# Patient Record
Sex: MALE | Race: WHITE | HISPANIC OR LATINO | Employment: OTHER | URBAN - METROPOLITAN AREA
[De-identification: names, ages, dates, MRNs, and addresses within clinical notes are randomized per-mention and may not be internally consistent; named-entity substitution may affect disease eponyms.]

---

## 2022-04-21 ENCOUNTER — OFFICE VISIT (OUTPATIENT)
Dept: INTERNAL MEDICINE | Facility: CLINIC | Age: 77
End: 2022-04-21
Payer: MEDICARE

## 2022-04-21 ENCOUNTER — LAB VISIT (OUTPATIENT)
Dept: LAB | Facility: HOSPITAL | Age: 77
End: 2022-04-21
Payer: MEDICARE

## 2022-04-21 VITALS
BODY MASS INDEX: 22.47 KG/M2 | HEART RATE: 80 BPM | HEIGHT: 71 IN | RESPIRATION RATE: 16 BRPM | WEIGHT: 160.5 LBS | SYSTOLIC BLOOD PRESSURE: 130 MMHG | DIASTOLIC BLOOD PRESSURE: 60 MMHG

## 2022-04-21 DIAGNOSIS — Z13.6 ENCOUNTER FOR ABDOMINAL AORTIC ANEURYSM (AAA) SCREENING: ICD-10-CM

## 2022-04-21 DIAGNOSIS — Z87.891 HISTORY OF SMOKING 25-50 PACK YEARS: ICD-10-CM

## 2022-04-21 DIAGNOSIS — Z11.3 SCREENING EXAMINATION FOR STD (SEXUALLY TRANSMITTED DISEASE): ICD-10-CM

## 2022-04-21 DIAGNOSIS — E11.9 TYPE 2 DIABETES MELLITUS WITHOUT COMPLICATION, WITHOUT LONG-TERM CURRENT USE OF INSULIN: ICD-10-CM

## 2022-04-21 DIAGNOSIS — M25.50 ARTHRALGIA, UNSPECIFIED JOINT: ICD-10-CM

## 2022-04-21 DIAGNOSIS — Z00.00 ENCOUNTER FOR ANNUAL PHYSICAL EXAM: Primary | ICD-10-CM

## 2022-04-21 DIAGNOSIS — Z00.00 ENCOUNTER FOR ANNUAL PHYSICAL EXAM: ICD-10-CM

## 2022-04-21 LAB
ALBUMIN SERPL BCP-MCNC: 3.9 G/DL (ref 3.5–5.2)
ALP SERPL-CCNC: 89 U/L (ref 55–135)
ALT SERPL W/O P-5'-P-CCNC: 33 U/L (ref 10–44)
ANION GAP SERPL CALC-SCNC: 13 MMOL/L (ref 8–16)
AST SERPL-CCNC: 19 U/L (ref 10–40)
BASOPHILS # BLD AUTO: 0.04 K/UL (ref 0–0.2)
BASOPHILS NFR BLD: 0.6 % (ref 0–1.9)
BILIRUB SERPL-MCNC: 0.3 MG/DL (ref 0.1–1)
BUN SERPL-MCNC: 13 MG/DL (ref 8–23)
CALCIUM SERPL-MCNC: 9.9 MG/DL (ref 8.7–10.5)
CCP AB SER IA-ACNC: <0.5 U/ML
CHLORIDE SERPL-SCNC: 103 MMOL/L (ref 95–110)
CHOLEST SERPL-MCNC: 236 MG/DL (ref 120–199)
CHOLEST/HDLC SERPL: 5.8 {RATIO} (ref 2–5)
CO2 SERPL-SCNC: 23 MMOL/L (ref 23–29)
CREAT SERPL-MCNC: 0.9 MG/DL (ref 0.5–1.4)
CRP SERPL-MCNC: 4.1 MG/L (ref 0–8.2)
DIFFERENTIAL METHOD: ABNORMAL
EOSINOPHIL # BLD AUTO: 0.2 K/UL (ref 0–0.5)
EOSINOPHIL NFR BLD: 3.6 % (ref 0–8)
ERYTHROCYTE [DISTWIDTH] IN BLOOD BY AUTOMATED COUNT: 12.6 % (ref 11.5–14.5)
ERYTHROCYTE [SEDIMENTATION RATE] IN BLOOD BY WESTERGREN METHOD: 13 MM/HR (ref 0–23)
EST. GFR  (AFRICAN AMERICAN): >60 ML/MIN/1.73 M^2
EST. GFR  (NON AFRICAN AMERICAN): >60 ML/MIN/1.73 M^2
GLUCOSE SERPL-MCNC: 280 MG/DL (ref 70–110)
HCT VFR BLD AUTO: 43.1 % (ref 40–54)
HDLC SERPL-MCNC: 41 MG/DL (ref 40–75)
HDLC SERPL: 17.4 % (ref 20–50)
HGB BLD-MCNC: 14.4 G/DL (ref 14–18)
IMM GRANULOCYTES # BLD AUTO: 0.04 K/UL (ref 0–0.04)
IMM GRANULOCYTES NFR BLD AUTO: 0.6 % (ref 0–0.5)
LDLC SERPL CALC-MCNC: ABNORMAL MG/DL (ref 63–159)
LYMPHOCYTES # BLD AUTO: 1.7 K/UL (ref 1–4.8)
LYMPHOCYTES NFR BLD: 26.4 % (ref 18–48)
MCH RBC QN AUTO: 30.1 PG (ref 27–31)
MCHC RBC AUTO-ENTMCNC: 33.4 G/DL (ref 32–36)
MCV RBC AUTO: 90 FL (ref 82–98)
MONOCYTES # BLD AUTO: 0.5 K/UL (ref 0.3–1)
MONOCYTES NFR BLD: 7.6 % (ref 4–15)
NEUTROPHILS # BLD AUTO: 4 K/UL (ref 1.8–7.7)
NEUTROPHILS NFR BLD: 61.2 % (ref 38–73)
NONHDLC SERPL-MCNC: 195 MG/DL
NRBC BLD-RTO: 0 /100 WBC
PLATELET # BLD AUTO: 327 K/UL (ref 150–450)
PMV BLD AUTO: 10.4 FL (ref 9.2–12.9)
POTASSIUM SERPL-SCNC: 4.7 MMOL/L (ref 3.5–5.1)
PROT SERPL-MCNC: 7.5 G/DL (ref 6–8.4)
RBC # BLD AUTO: 4.79 M/UL (ref 4.6–6.2)
RHEUMATOID FACT SERPL-ACNC: <13 IU/ML (ref 0–15)
SODIUM SERPL-SCNC: 139 MMOL/L (ref 136–145)
TRIGL SERPL-MCNC: 402 MG/DL (ref 30–150)
WBC # BLD AUTO: 6.6 K/UL (ref 3.9–12.7)

## 2022-04-21 PROCEDURE — 99999 PR PBB SHADOW E&M-NEW PATIENT-LVL III: CPT | Mod: PBBFAC,GC,,

## 2022-04-21 PROCEDURE — 80061 LIPID PANEL: CPT

## 2022-04-21 PROCEDURE — 86803 HEPATITIS C AB TEST: CPT

## 2022-04-21 PROCEDURE — 86695 HERPES SIMPLEX TYPE 1 TEST: CPT

## 2022-04-21 PROCEDURE — 85025 COMPLETE CBC W/AUTO DIFF WBC: CPT

## 2022-04-21 PROCEDURE — 80053 COMPREHEN METABOLIC PANEL: CPT

## 2022-04-21 PROCEDURE — 99204 OFFICE O/P NEW MOD 45 MIN: CPT | Mod: S$PBB,GC,,

## 2022-04-21 PROCEDURE — 86696 HERPES SIMPLEX TYPE 2 TEST: CPT

## 2022-04-21 PROCEDURE — 36415 COLL VENOUS BLD VENIPUNCTURE: CPT

## 2022-04-21 PROCEDURE — 99203 OFFICE O/P NEW LOW 30 MIN: CPT | Mod: PBBFAC

## 2022-04-21 PROCEDURE — 86431 RHEUMATOID FACTOR QUANT: CPT

## 2022-04-21 PROCEDURE — 86592 SYPHILIS TEST NON-TREP QUAL: CPT

## 2022-04-21 PROCEDURE — 87389 HIV-1 AG W/HIV-1&-2 AB AG IA: CPT

## 2022-04-21 PROCEDURE — 99999 PR PBB SHADOW E&M-NEW PATIENT-LVL III: ICD-10-PCS | Mod: PBBFAC,GC,,

## 2022-04-21 PROCEDURE — 86140 C-REACTIVE PROTEIN: CPT

## 2022-04-21 PROCEDURE — 83036 HEMOGLOBIN GLYCOSYLATED A1C: CPT

## 2022-04-21 PROCEDURE — 99204 PR OFFICE/OUTPT VISIT, NEW, LEVL IV, 45-59 MIN: ICD-10-PCS | Mod: S$PBB,GC,,

## 2022-04-21 PROCEDURE — 85652 RBC SED RATE AUTOMATED: CPT

## 2022-04-21 PROCEDURE — 86038 ANTINUCLEAR ANTIBODIES: CPT

## 2022-04-21 PROCEDURE — 86200 CCP ANTIBODY: CPT

## 2022-04-21 RX ORDER — METFORMIN HYDROCHLORIDE 1000 MG/1
1000 TABLET ORAL 2 TIMES DAILY WITH MEALS
COMMUNITY
End: 2022-04-22

## 2022-04-21 NOTE — PROGRESS NOTES
Subjective     Chief Complaint:    History of Present Illness:  Mr. Brett Terrell is a 76 y.o. male with T2DM presenting to clinic to establish care. Patient has a history of diabetes that was controlled with metformin but patient states that his previous glucose was elevated. He also complains of arthritis of the hands and knees. Finally, he complained of a red rash in his inguinal area and penis that he wanted to check up on. He states the rash has been there for a month and has not gone away. He will be leaving to [place] in 1-2 weeks. He denies any fevers, chills, headache, nausea, vomiting, diarrhea, or constipation.     Diet: good vegetables, fruit, and fish  Exercise: 2 times a week for 30 minutes  Tobacco use: use to smoke, 1 ppd for 25 years, quit 6-8 months ago  Alcohol use: 5 beers every month  Drug use: none  Sex: no  Occupation: works at a [employer]  Living: house with his daughters    Colorectal Ca: --NA for age  BP--cont to monitor  Depression--none  Type 2 DM--screen  Hep C--screen  Lipids--screen  HIV--not at risk  Prostate --na  Vision--check annually      Review of Systems   Constitutional: Negative for chills and fever.   HENT: Negative for sore throat.    Respiratory: Negative for shortness of breath, wheezing and stridor.    Cardiovascular: Negative for chest pain, palpitations and leg swelling.   Gastrointestinal: Negative for abdominal pain, blood in stool, constipation, diarrhea, nausea and vomiting.   Genitourinary: Negative for dysuria, flank pain, frequency and hematuria.        Rash on penis   Musculoskeletal: Positive for joint pain. Negative for back pain and neck pain.   Neurological: Negative for dizziness, focal weakness, weakness and headaches.   Endo/Heme/Allergies: Does not bruise/bleed easily.   Psychiatric/Behavioral: Negative for memory loss. The patient does not have insomnia.        PAST HISTORY:     Past Medical History:   Diagnosis Date    Type 2 diabetes  "mellitus without complications        Past Surgical History:   Procedure Laterality Date    SHOULDER SURGERY      SPINAL FUSION         Family History   Problem Relation Age of Onset    No Known Problems Mother     Heart disease Father     No Known Problems Sister     No Known Problems Brother        Social History     Socioeconomic History    Marital status:        MEDICATIONS & ALLERGIES:     Current Outpatient Medications on File Prior to Visit   Medication Sig    metFORMIN (GLUCOPHAGE) 1000 MG tablet Take 1,000 mg by mouth 2 (two) times daily with meals.     No current facility-administered medications on file prior to visit.       Review of patient's allergies indicates:  No Known Allergies    OBJECTIVE:     Vital Signs:  Vitals:    04/21/22 1244   BP: 130/60   BP Location: Right arm   Patient Position: Sitting   Pulse: 80   Resp: 16   Weight: 72.8 kg (160 lb 7.9 oz)   Height: 5' 11" (1.803 m)       Body mass index is 22.38 kg/m².     Physical Exam:  General:  Well developed, well nourished, no acute distress  Head: Normocephalic, atraumatic  Eyes: PERRL, EOMI, clear sclera  Throat: No posterior pharyngeal erythema or exudate, no tonsillar exudate  Neck: supple, normal ROM, no thyromegaly   CVS:  RRR, S1 and S2 normal, no murmurs, rubs, gallops, 2+ peripheral pulses  Resp:  Lungs clear to auscultation, no wheezes, rales, rhonchi, cough  GI:  Abdomen soft, non-tender, non-distended, normoactive bowel sounds  MSK:  No muscle atrophy, cyanosis, peripheral edema. Patient has hypertrophy of the PIP. Patient also has ulnar deviation of the left hand 4th digit.   Skin:  No rashes, ulcers, erythema  Neuro:  CNII-XII grossly intact, no focal deficits noted  Psych:  Appropriate mood and affect, normal judgement  : red rash with flaking skin on inguinal area and on shaft of penis.     Laboratory  Lab Results   Component Value Date    WBC 7.90 04/07/2009    HGB 13.9 (L) 04/07/2009    HCT 39.7 (L) " 04/07/2009    MCV 90.2 04/07/2009     04/07/2009     Diagnostic Results:  no previous labs    ASSESSMENT & PLAN:   Mr. Brett Terrell is a 76 y.o. male who presents today to establish care.     Encounter for annual physical exam  -     HEPATITIS C ANTIBODY; Future  -     Lipid Panel; Future  -     CBC W/ AUTO DIFFERENTIAL; Future  -     COMPREHENSIVE METABOLIC PANEL; Future  -     HEMOGLOBIN A1C; Future    Arthralgia, unspecified joint  -     RHEUMATOID FACTOR; Future  -     CYCLIC CITRUL PEPTIDE ANTIBODY, IGG; Future  -     Sedimentation rate; Future  -     C-REACTIVE PROTEIN; Future  -     ALISA; Future    History of smoking 25-50 pack years  -     VAS US AAA Screening; Future    Encounter for abdominal aortic aneurysm (AAA) screening  -     VAS US AAA Screening; Future    Type 2 diabetes mellitus without complication, without long-term current use of insulin  -     HEPATITIS C ANTIBODY; Future  -     Lipid Panel; Future  -     CBC W/ AUTO DIFFERENTIAL; Future  -     COMPREHENSIVE METABOLIC PANEL; Future  -     HEMOGLOBIN A1C; Future    Intertrigo  Screening examination for STD (sexually transmitted disease)  Patient has a rash consistent with intertrigo. Patient has not had any new sexual partners but patient has not seen a PCP in many years. Given findings on physical exam, will order STD testing.   -     RPR; Future  -     HERPES SIMPLEX 1&2 IGG; Future  -     HIV 1/2 Ag/Ab (4th Gen); Future          RTC in 6 months or PRN    Case discussed with Dr Machelle Perez MD  Internal Medicine PGY1  [facility]  [address] [place] [id number]  [phone]

## 2022-04-22 ENCOUNTER — TELEPHONE (OUTPATIENT)
Dept: INTERNAL MEDICINE | Facility: CLINIC | Age: 77
End: 2022-04-22
Payer: MEDICARE

## 2022-04-22 ENCOUNTER — CLINICAL SUPPORT (OUTPATIENT)
Dept: DIABETES | Facility: CLINIC | Age: 77
End: 2022-04-22
Payer: MEDICARE

## 2022-04-22 ENCOUNTER — OFFICE VISIT (OUTPATIENT)
Dept: ENDOCRINOLOGY | Facility: CLINIC | Age: 77
End: 2022-04-22
Payer: MEDICARE

## 2022-04-22 ENCOUNTER — LAB VISIT (OUTPATIENT)
Dept: LAB | Facility: HOSPITAL | Age: 77
End: 2022-04-22
Payer: MEDICARE

## 2022-04-22 VITALS
DIASTOLIC BLOOD PRESSURE: 74 MMHG | SYSTOLIC BLOOD PRESSURE: 134 MMHG | OXYGEN SATURATION: 98 % | HEART RATE: 89 BPM | HEIGHT: 71 IN | BODY MASS INDEX: 21.88 KG/M2 | WEIGHT: 156.31 LBS

## 2022-04-22 DIAGNOSIS — E11.9 TYPE 2 DIABETES MELLITUS WITHOUT COMPLICATION, WITHOUT LONG-TERM CURRENT USE OF INSULIN: ICD-10-CM

## 2022-04-22 DIAGNOSIS — I51.7 LEFT VENTRICULAR HYPERTROPHY: ICD-10-CM

## 2022-04-22 DIAGNOSIS — E11.9 TYPE 2 DIABETES MELLITUS WITHOUT COMPLICATION, WITHOUT LONG-TERM CURRENT USE OF INSULIN: Primary | ICD-10-CM

## 2022-04-22 DIAGNOSIS — E78.1 HYPERTRIGLYCERIDEMIA: ICD-10-CM

## 2022-04-22 LAB
ANA SER QL IF: NORMAL
ESTIMATED AVG GLUCOSE: 346 MG/DL (ref 68–131)
HBA1C MFR BLD: 13.7 % (ref 4–5.6)
HSV1 IGG SERPL QL IA: POSITIVE
HSV2 IGG SERPL QL IA: POSITIVE
RPR SER QL: NORMAL
TSH SERPL DL<=0.005 MIU/L-ACNC: 1.31 UIU/ML (ref 0.4–4)

## 2022-04-22 PROCEDURE — 99999 PR PBB SHADOW E&M-EST. PATIENT-LVL IV: ICD-10-PCS | Mod: PBBFAC,GC,, | Performed by: STUDENT IN AN ORGANIZED HEALTH CARE EDUCATION/TRAINING PROGRAM

## 2022-04-22 PROCEDURE — 99204 PR OFFICE/OUTPT VISIT, NEW, LEVL IV, 45-59 MIN: ICD-10-PCS | Mod: S$PBB,GC,, | Performed by: STUDENT IN AN ORGANIZED HEALTH CARE EDUCATION/TRAINING PROGRAM

## 2022-04-22 PROCEDURE — 99214 OFFICE O/P EST MOD 30 MIN: CPT | Mod: PBBFAC | Performed by: STUDENT IN AN ORGANIZED HEALTH CARE EDUCATION/TRAINING PROGRAM

## 2022-04-22 PROCEDURE — 84443 ASSAY THYROID STIM HORMONE: CPT | Performed by: STUDENT IN AN ORGANIZED HEALTH CARE EDUCATION/TRAINING PROGRAM

## 2022-04-22 PROCEDURE — G0108 DIAB MANAGE TRN  PER INDIV: HCPCS | Mod: PBBFAC | Performed by: DIETITIAN, REGISTERED

## 2022-04-22 PROCEDURE — 99204 OFFICE O/P NEW MOD 45 MIN: CPT | Mod: S$PBB,GC,, | Performed by: STUDENT IN AN ORGANIZED HEALTH CARE EDUCATION/TRAINING PROGRAM

## 2022-04-22 PROCEDURE — 36415 COLL VENOUS BLD VENIPUNCTURE: CPT | Performed by: STUDENT IN AN ORGANIZED HEALTH CARE EDUCATION/TRAINING PROGRAM

## 2022-04-22 PROCEDURE — 99999 PR PBB SHADOW E&M-EST. PATIENT-LVL IV: CPT | Mod: PBBFAC,GC,, | Performed by: STUDENT IN AN ORGANIZED HEALTH CARE EDUCATION/TRAINING PROGRAM

## 2022-04-22 RX ORDER — METFORMIN HYDROCHLORIDE 500 MG/1
1000 TABLET, EXTENDED RELEASE ORAL 2 TIMES DAILY WITH MEALS
Qty: 360 TABLET | Refills: 3 | Status: SHIPPED | OUTPATIENT
Start: 2022-04-22 | End: 2023-04-22

## 2022-04-22 RX ORDER — DULAGLUTIDE 0.75 MG/.5ML
0.75 INJECTION, SOLUTION SUBCUTANEOUS
Qty: 4 PEN | Refills: 3 | Status: SHIPPED | OUTPATIENT
Start: 2022-04-22 | End: 2022-05-22

## 2022-04-22 RX ORDER — INSULIN DEGLUDEC 100 U/ML
10 INJECTION, SOLUTION SUBCUTANEOUS DAILY
Qty: 1 PEN | Refills: 6 | Status: SHIPPED | OUTPATIENT
Start: 2022-04-22 | End: 2023-04-22

## 2022-04-22 RX ORDER — LANCETS
EACH MISCELLANEOUS
Qty: 100 EACH | Refills: 6 | Status: SHIPPED | OUTPATIENT
Start: 2022-04-22 | End: 2022-04-27 | Stop reason: SDUPTHER

## 2022-04-22 RX ORDER — INSULIN PUMP SYRINGE, 3 ML
EACH MISCELLANEOUS
Qty: 1 EACH | Refills: 0 | Status: SHIPPED | OUTPATIENT
Start: 2022-04-22 | End: 2022-04-27 | Stop reason: SDUPTHER

## 2022-04-22 RX ORDER — ROSUVASTATIN CALCIUM 10 MG/1
10 TABLET, COATED ORAL NIGHTLY
Qty: 90 TABLET | Refills: 3 | Status: SHIPPED | OUTPATIENT
Start: 2022-04-22 | End: 2023-04-22

## 2022-04-22 NOTE — ASSESSMENT & PLAN NOTE
-  A1C:  13.7   13.7     -  Foot exam performed today   No abnormalities  -  Optometry planned within 1-2 weeks No retinopathy per patient  -  MADHAVI  ordered     Not on ACE or ARB at this time   -  Lipids reviewed    starting On statin                BG Trend: no glu logs provided today, denies lows    Plans on returning to [place] for follow up with physician there making T2D management difficult. [place].     PLAN:   -  Start Tresiba 10 units daily  -  Start Trulicity 0.75 mg weekly  -  Start metformin XR 1000 mg b.i.d.  -  Discontinue metformin 1 g b.i.d./Glibenclamida 5 mg BID combo  -  Rx for DM supplies, lancets, strips etc  -  Referred to education today for injection training and comprehensive DM Education     Labs Ordered:  -  MADHAVI, TSH,

## 2022-04-22 NOTE — PROGRESS NOTES
Diabetes Care Specialist Progress Note  Author: Lor Taylor RD, CDE  Date: 4/22/2022    Program Intake  Reason for Diabetes Program Visit:: Intervention  Type of Intervention:: Individual  Individual: Medication Training    Education: Nutrition and Meal Planning  Medication Training: Insulin Pen    Current diabetes risk level:: high      Lab Results   Component Value Date    HGBA1C 13.7 (H) 04/21/2022         Clinical  Problem Review  Reviewed Problem List with Patient: yes  Active comorbidities affecting diabetes self-care.: no  Reviewed health maintenance: yes    Clinical Assessment  Current Diabetes Treatment: Oral Medication, Insulin, Injectable  Metformin    To start:   Oscar Edwards      Have you ever experienced hypoglycemia (low blood sugar)?: no  Have you ever experienced hyperglycemia (high blood sugar)?: yes (having sx of hyperglycemia, but rarely testing)  Are you able to tell when your blood sugar is high?: Yes  What are your symptoms?: frequent urination, thirst          Medication Information  Medication adherence impacting ability to self-manage diabetes?: No    Labs  Lab Compliance Barriers: No        Nutritional Status  Diet: Regular, Diabetic diet (eating low carb; avoiding tortillas, rice, pasta; likes fruit)  Change in appetite?: No  Recent Changes in Weight: No Recent Weight Change  Current nutritional status an area of need that is impacting patient's ability to self-manage diabetes?: No              Additional Social History  Support  Does anyone support you with your diabetes care?:  (pt is primary caregiver; reports family is supportive in care)  Who takes you to your medical appointments?: self  Does the current support meet the patient's needs?: Yes  Is Support an area impacting ability to self-manage diabetes?: No    Access to [employer]  Media or technology needs impacting ability to self-manage diabetes?: No    Cognitive/Behavioral Health  Alert and Oriented:  Yes  Difficulty Thinking: No  Requires Prompting: No  Requires assistance for routine expression?: No  Cognitive or behavioral barriers impacting ability to self-manage diabetes?: No    Culture/Jewish  Culture or Denominational beliefs that may impact ability to access healthcare: No    Communication  Language preference: English  Hearing Problems: No  Vision Problems: No  Communication needs impacting ability to self-manage diabetes?: No    Health Literacy  Preferred Learning Method: Face to Face, Demonstration, Hands On, Reading Materials  How often do you need to have someone help you read instructions, pamphlets, or written material from your doctor or pharmacy?: Never  Health literacy needs impacting ability to self-manage diabetes?: No                Diabetes Self-Management Care Plan:    Today's Diabetes Self-Management Care Plan was developed with Brett's input. Brett has agreed to work toward the following goal(s) to improve his/her overall diabetes control.      Care Plan: Diabetes Management   Updates made since 3/23/2022 12:00 AM      Problem: Medications       Long-Range Goal: Patient will take all insulin and other diabetes related medications as discussed today.    Start Date: 4/22/2022   Expected End Date: 4/22/2023   Priority: High   Barriers: Knowledge deficit   Note:    Here today for injectable training.   He will be starting basal insulin and Trulicity weekly.     Going back to [place] next week. Lives full time in [place], but comes to the [place] regularly to visit family.     Gets most of his healthcare in [place] but does have PCP in [place]. Encouraged pt to let his PCP in [place] know about diabetes and new prescriptions, especially insulin therapy.     Trained pt on how to give Trulicity injection.     Trained on how to give insulin with insulin pen.     Discussed injection and rotation of sites, medication storage, timing, and MOA.     Pt able to return demo without issue.       [place] DM  material given and discussed.   Dietary habits discussed - he is already eliminating high starch foods: rice, tortilla, potatoes, pasta.     Eating a lot of meat, fish, vegetables.     Discussed myplate and reviewed handouts with him today.   He reports that his daughter is a nurse and helps him with meds and dietary choices also.        Task: Reviewed with patient all current diabetes medications and provided basic review of the purpose, dosage, frequency, side effects, and storage of both oral and injectable diabetes medications. Completed 4/22/2022      Task: Reviewed possible resources for acquiring cost prohibitive medication. Completed 4/22/2022      Task: Instructed patient on how to self-administer insulin via insulin pen, and injectible GLP via pen Completed 4/22/2022      Task: Discussed guidelines for preventing, detecting and treating hypoglycemia and hyperglycemia and reviewed the importance of meal and medication timing with diabetes mediations for prevention of hypoglycemia and maximum drug benefit. Completed 4/22/2022                  Follow Up Plan     F/u next time you return to [place]           Today's care plan and follow up schedule was discussed with patient.  Brett verbalized understanding of the care plan, goals, and agrees to follow up plan.        The patient was encouraged to communicate with his/her health care provider/physician and care team regarding his/her condition(s) and treatment.  I provided the patient with my contact information today and encouraged to contact me via phone or [facility] as needed.           Length of Visit   Total Time: 45 Minutes

## 2022-04-22 NOTE — PROGRESS NOTES
Subjective:      Patient ID: Brett Terrell is a 76 y.o. male.    Chief Complaint:  Consult      History of Present Illness  Male presents for initial evaluation of T2D    Patient has a history of diabetes that was controlled with metformin but patient states that his previous glucose was elevated.   He will be leaving to [place] in 1-2 weeks.    Appt with eye doctor Monday, recently made diet lifestyle changes      Regarding diabetes:  Initially Diagnosed with T2D:  >25 years ago     Current symptoms/problems:   Denies any nocturia, nausea vomiting, abdominal discomfort, visual change,   Endorses weight loss 25 pounds within 6 months, polydipsia, polyuria,  numbness/tingling  Denies h/o frequent UTIs/yeast infections, DKA, ketogenic diet, diuretics  Denies h/o pancreatitis, recurrent gallstones, daily etoh use, MEN syndrome, pancreatic tumors, gastroparesis, diabetic retinopathy     Known diabetic complications: none   Cardiovascular risk factors: advanced age (older than 55 for men, 65 for women), HLD, uncontrolled T2D     Current diabetic medications include:   1. MFN 1g BID / Glibenclamida 5 mg    Wt based TDD: 36 u     Other medications tried:  1. none     Diet: consumes 2 meals a day lunch and dinner, salads, tuna, limiting tortilla     Exercise: walks occ     Glucose Trends:  has machine but rarely checks, strips are too expensive     Any episodes of hypoglycemia? no      Wt Readings from Last 10 Encounters:   04/22/22 70.9 kg (156 lb 4.9 oz)   04/21/22 72.8 kg (160 lb 7.9 oz)       Diabetes Management Status     Statin: Not taking   ACE/ARB: Not taking      Screening or Prevention Patient's value   HgA1C Testing and Control   Lab Results   Component Value Date    HGBA1C 13.7 (H) 04/21/2022        Lipid profile : 04/21/2022   LDL control Lab Results   Component Value Date    LDLCALC Invalid, Trig>400.0 04/21/2022      Nephropathy screening No results found for: LABMICR  No results found for: PROTEINUA  "  Blood pressure BP Readings from Last 1 Encounters:   04/22/22 134/74      Dilated retinal exam Most Recent Eye Exam Date: Not Found   Foot exam   : 04/22/2022        Lab Results   Component Value Date    HGBA1C 13.7 (H) 04/21/2022        ROS as above    Objective:   /74   Pulse 89   Ht 5' 11" (1.803 m)   Wt 70.9 kg (156 lb 4.9 oz)   SpO2 98%   BMI 21.80 kg/m²     Physical Exam  Vitals and nursing note reviewed.   Constitutional:       Appearance: well-developed.  obese.    Neck:      Thyroid: No thyromegaly.      Trachea: No tracheal deviation.   Cardiovascular:      Rate and Rhythm: Normal rate.      Pulses: Normal pulses.   Pulmonary:      Effort: Pulmonary effort is normal.    Musculoskeletal:      Right lower leg: No edema.      Left lower leg: No edema.   Skin:     General: Skin is warm.   Neurological:      Mental Status: alert and oriented to person, place, and time.     Protective Sensation (w/ 10 gram monofilament):  Right: Diminished  Left: Decreased    Visual Inspection:  Normal -  Bilateral    Pedal Pulses:   Right: Present  Left: Present    Posterior tibialis:   Right:Present  Left: Present      BP Readings from Last 1 Encounters:   04/22/22 134/74      Wt Readings from Last 1 Encounters:   04/22/22 1109 70.9 kg (156 lb 4.9 oz)     Body mass index is 21.8 kg/m².    Lab Review:   Lab Results   Component Value Date    HGBA1C 13.7 (H) 04/21/2022     Lab Results   Component Value Date    CHOL 236 (H) 04/21/2022    HDL 41 04/21/2022    LDLCALC Invalid, Trig>400.0 04/21/2022    TRIG 402 (H) 04/21/2022    CHOLHDL 17.4 (L) 04/21/2022     Lab Results   Component Value Date     04/21/2022    K 4.7 04/21/2022     04/21/2022    CO2 23 04/21/2022     (H) 04/21/2022    BUN 13 04/21/2022    CREATININE 0.9 04/21/2022    CALCIUM 9.9 04/21/2022    PROT 7.5 04/21/2022    ALBUMIN 3.9 04/21/2022    BILITOT 0.3 04/21/2022    ALKPHOS 89 04/21/2022    AST 19 04/21/2022    ALT 33 04/21/2022    " ANIONGAP 13 04/21/2022    ESTGFRAFRICA >60.0 04/21/2022    EGFRNONAA >60.0 04/21/2022       All pertinent labs reviewed    Assessment and Plan     Type 2 diabetes mellitus without complication, without long-term current use of insulin  -  A1C:  13.7   13.7     -  Foot exam performed today   No abnormalities  -  Optometry planned within 1-2 weeks No retinopathy per patient  -  MADHAVI  ordered     Not on ACE or ARB at this time   -  Lipids reviewed    starting On statin                BG Trend: no glu logs provided today, denies lows    Plans on returning to [place] for follow up with physician there making T2D management difficult. Pt returns to the [facility] in [place] once yearly for physician visits.  Due to no glu logs, significantly uncontrolled glu with limited history of patient will approach initial management with caution to avoid hypoglycemia.     PLAN:   -  Start Tresiba 10 units daily  -  Start Trulicity 0.75 mg weekly  -  Start metformin XR 1000 mg b.i.d.  -  Discontinue metformin 1 g b.i.d./Glibenclamida 5 mg BID combo  -  Rx for DM supplies, lancets, strips etc  -  Referred to education today for injection training and comprehensive DM Education     Labs Ordered:  -  MADHAVI, TSH,      Hypertriglyceridemia  --Discussed lifestyle, diet and exercise modifications  --Aggressive glucose control  --Start low-dose rosuvastatin 10 mg at bedtime and increase pending tolerance  --Fu Lipid panel in about 6 months if still in the [facility] in [place].    Left ventricular hypertrophy  --Stress echo in 2015: left ventricular function augmenting with the ejection fraction 70  --Denies any chest discomfort, dyspnea or exercise intolerance  --Denies h/o MI, stroke, chf  --/74 today, may benefit from ACE/ARB         RTC in 3 months if [place] in the [place]     Leonel Wilson DO  Endocrinology Fellow  [facility], 6th Floor  [address] [facility] [place], [address]    Office: [phone]  Fax: [phone]        Disclaimer: This note has been generated using voice-recognition software. There may be typographical errors that have been missed during proof-reading.    The above history labs imaging impression and plan were discussed with attending physician who is in agreement and also took part in this patient's care.  I personally reviewed all of the patients available medications, labs, imaging, vitals, allergies, medical history.

## 2022-04-22 NOTE — PROGRESS NOTES
I have reviewed and concur with the fellow's history, assessment, and plan.  I have personally interviewed the patient and all questions were answered.

## 2022-04-22 NOTE — TELEPHONE ENCOUNTER
Called patient regarding elevated HbA1c and abnormal lipid panel. Patient had seen endocrinology earlier today and is started on an insulin regimen and rosuvastatin. Patient will be flying back to [place] next month and will see a PCP there for close follow up.

## 2022-04-22 NOTE — ASSESSMENT & PLAN NOTE
Stress echo in 2015: left ventricular function augmenting with the ejection fraction 70  Denies any chest discomfort, dyspnea or exercise intolerance  Denies h/o MI, stroke, chf  /74 today, may benefit from ACE/ARB

## 2022-04-22 NOTE — ASSESSMENT & PLAN NOTE
--Discussed lifestyle, diet and exercise modifications  --Aggressive glucose control  --Start low-dose rosuvastatin 10 mg at bedtime and increase pending tolerance  --Fu Lipid panel in about 6 months if still [place]

## 2022-04-25 ENCOUNTER — PATIENT MESSAGE (OUTPATIENT)
Dept: ENDOCRINOLOGY | Facility: CLINIC | Age: 77
End: 2022-04-25
Payer: MEDICARE

## 2022-04-26 ENCOUNTER — HOSPITAL ENCOUNTER (OUTPATIENT)
Dept: VASCULAR SURGERY | Facility: CLINIC | Age: 77
Discharge: HOME | End: 2022-04-26
Payer: MEDICARE

## 2022-04-26 DIAGNOSIS — Z87.891 HISTORY OF SMOKING 25-50 PACK YEARS: ICD-10-CM

## 2022-04-26 DIAGNOSIS — Z13.6 ENCOUNTER FOR ABDOMINAL AORTIC ANEURYSM (AAA) SCREENING: ICD-10-CM

## 2022-04-26 LAB
HCV AB SERPL QL IA: NEGATIVE
HIV 1+2 AB+HIV1 P24 AG SERPL QL IA: NEGATIVE

## 2022-04-26 PROCEDURE — 76706 US ABDL AORTA SCREEN AAA: CPT | Mod: PBBFAC | Performed by: SURGERY

## 2022-04-26 PROCEDURE — 76706 US ABDL AORTA SCREEN AAA: CPT | Mod: 26,S$PBB,, | Performed by: SURGERY

## 2022-04-26 PROCEDURE — 76706 PR US, AAA, SCREENING: ICD-10-PCS | Mod: 26,S$PBB,, | Performed by: SURGERY

## 2022-04-27 ENCOUNTER — PATIENT MESSAGE (OUTPATIENT)
Dept: ENDOCRINOLOGY | Facility: CLINIC | Age: 77
End: 2022-04-27
Payer: MEDICARE

## 2022-04-27 DIAGNOSIS — E11.9 TYPE 2 DIABETES MELLITUS WITHOUT COMPLICATION, WITHOUT LONG-TERM CURRENT USE OF INSULIN: ICD-10-CM

## 2022-04-27 RX ORDER — LANCETS
EACH MISCELLANEOUS
Qty: 100 EACH | Refills: 11 | Status: SHIPPED | OUTPATIENT
Start: 2022-04-27 | End: 2023-04-27

## 2022-04-27 RX ORDER — INSULIN PUMP SYRINGE, 3 ML
EACH MISCELLANEOUS
Qty: 1 EACH | Refills: 0 | Status: SHIPPED | OUTPATIENT
Start: 2022-04-27

## 2022-04-27 RX ORDER — INSULIN PUMP SYRINGE, 3 ML
EACH MISCELLANEOUS
Qty: 1 EACH | Refills: 0 | Status: SHIPPED | OUTPATIENT
Start: 2022-04-27 | End: 2022-04-27 | Stop reason: SDUPTHER

## 2022-04-27 RX ORDER — LANCETS
EACH MISCELLANEOUS
Qty: 100 EACH | Refills: 11 | Status: SHIPPED | OUTPATIENT
Start: 2022-04-27 | End: 2022-04-27 | Stop reason: SDUPTHER

## 2022-04-27 NOTE — TELEPHONE ENCOUNTER
Medicare part D will only pay for patient testing 3 times per day.  Patient's L.O.V. 4/2022 By [facility]  Refill sent

## 2022-04-27 NOTE — TELEPHONE ENCOUNTER
"----- Message from Suzan Weber sent at 4/27/2022 11:31 AM CDT -----  Regarding: Medication  "Type:  Patient Call Back    Who Called:Erica Carroll)    What is the reqeust in detail:Requesting call back for directions on medication sent today.Please advise    Can the clinic reply by [facility]?no    Best Call Back Number:[phone]      Additional Information:Pharmacy stated medicare part B allows testing 3x a day            "

## 2022-04-28 ENCOUNTER — PATIENT MESSAGE (OUTPATIENT)
Dept: ENDOCRINOLOGY | Facility: CLINIC | Age: 77
End: 2022-04-28
Payer: MEDICARE

## 2022-04-28 DIAGNOSIS — E11.9 TYPE 2 DIABETES MELLITUS WITHOUT COMPLICATION, WITHOUT LONG-TERM CURRENT USE OF INSULIN: Primary | ICD-10-CM

## 2022-04-28 RX ORDER — EMPAGLIFLOZIN 10 MG/1
10 TABLET, FILM COATED ORAL DAILY
Qty: 90 TABLET | Refills: 1 | Status: SHIPPED | OUTPATIENT
Start: 2022-04-28

## 2022-06-28 ENCOUNTER — TELEPHONE (OUTPATIENT)
Dept: ADMINISTRATIVE | Facility: HOSPITAL | Age: 77
End: 2022-06-28
Payer: MEDICARE